# Patient Record
Sex: MALE | Race: ASIAN | NOT HISPANIC OR LATINO | URBAN - METROPOLITAN AREA
[De-identification: names, ages, dates, MRNs, and addresses within clinical notes are randomized per-mention and may not be internally consistent; named-entity substitution may affect disease eponyms.]

---

## 2020-07-28 ENCOUNTER — EMERGENCY (EMERGENCY)
Facility: HOSPITAL | Age: 30
LOS: 1 days | Discharge: HOME | End: 2020-07-28
Attending: EMERGENCY MEDICINE | Admitting: EMERGENCY MEDICINE
Payer: COMMERCIAL

## 2020-07-28 VITALS
SYSTOLIC BLOOD PRESSURE: 138 MMHG | HEIGHT: 67 IN | RESPIRATION RATE: 19 BRPM | HEART RATE: 75 BPM | WEIGHT: 263.01 LBS | DIASTOLIC BLOOD PRESSURE: 84 MMHG | TEMPERATURE: 98 F | OXYGEN SATURATION: 97 %

## 2020-07-28 VITALS
TEMPERATURE: 98 F | HEART RATE: 72 BPM | SYSTOLIC BLOOD PRESSURE: 131 MMHG | OXYGEN SATURATION: 98 % | RESPIRATION RATE: 17 BRPM | DIASTOLIC BLOOD PRESSURE: 85 MMHG

## 2020-07-28 PROCEDURE — 93005 ELECTROCARDIOGRAM TRACING: CPT

## 2020-07-28 PROCEDURE — 99283 EMERGENCY DEPT VISIT LOW MDM: CPT

## 2020-07-28 PROCEDURE — 71046 X-RAY EXAM CHEST 2 VIEWS: CPT

## 2020-07-28 PROCEDURE — 93010 ELECTROCARDIOGRAM REPORT: CPT

## 2020-07-28 PROCEDURE — 99283 EMERGENCY DEPT VISIT LOW MDM: CPT | Mod: 25

## 2020-07-28 PROCEDURE — 71046 X-RAY EXAM CHEST 2 VIEWS: CPT | Mod: 26

## 2020-07-28 NOTE — ED ADULT NURSE NOTE - OBJECTIVE STATEMENT
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 pt feels back to baseline. denies SOB or SP.

## 2020-07-28 NOTE — ED PROVIDER NOTE - OBJECTIVE STATEMENT
Patient was walking his route as a  today. In the heat, he felt as if he developed heat exhaustion. Was sweaty, weak. Then felt SOB and he had to stop and sit down. The SOB worried him so he came to be checked. Feels back to normal now. The SOB lasted about 5 minutes. No CP. No palpitations. No history of same

## 2020-07-28 NOTE — ED PROVIDER NOTE - CLINICAL SUMMARY MEDICAL DECISION MAKING FREE TEXT BOX
Patient with heat related symptoms, now resolved. Will get ekg and CXR to assess for other causes of transient SOB

## 2020-07-28 NOTE — ED PROVIDER NOTE - CARE PROVIDER_API CALL
Altaf Hartmann  CARDIOVASCULAR DISEASE  [address]  [place] [address]  Phone: [phone]  Fax: [phone]  Follow Up Time:

## 2020-07-28 NOTE — ED ADULT NURSE NOTE - CHIEF COMPLAINT QUOTE
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 Denies chest pain. I feel normal now but I was concerned. I threw up once today. I only ate 2 bananas today. I think I had heat exhaustion. I'm coming from urgent care to be further evaluated

## 2020-07-28 NOTE — ED ADULT TRIAGE NOTE - CHIEF COMPLAINT QUOTE
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 I feel normal now but I was concerned. I threw up once today. I only ate 2 bananas today. I think I had heat exhaustion. 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 Denies chest pain. I feel normal now but I was concerned. I threw up once today. I only ate 2 bananas today. I think I had heat exhaustion. I'm coming from urgent care to be further evaluated

## 2020-07-28 NOTE — ED ADULT NURSE NOTE - PERIPHERAL PULSES
[address] [facility] [id number]    [facility]      1/21/2020      This is to certify that Oz Wood is under my care.    Please excuse Oz from lifting no more than 15 pounds from 1/21/2020 through 2/5/2020 due to medical condition.     Comments: He will be having surgery on 2/6/2020. Further restrictions to come from surgeon.      Thank you,      Carol Morley MD  [facility]  [address], [place] [id number] [phone]   equal bilaterally

## 2020-07-28 NOTE — ED ADULT NURSE NOTE - CAS DISCH ACCOMP BY
ED Visit Note


First contact with patient:  15:38


 CHIEF COMPLAINT: Finger laceration





HISTORY OF PRESENT ILLNESS: This 3-year-old male patient presents to the 

emergency department after cutting the right finger first finger about 2 hours 

ago.  The patient was cutting a piece of sheet metal, when he accidentally cut 

the posterior proximal right thumb. The bleeding has has stopped. Denies 

weakness or numbness of the finger. The patient has full range of motion of the 

fingers. The patient rates the pain as sharp and 3/10.  The patient denies any 

other injuries. The patient's tetanus shot is not up to date.  The patient 

cleansed the wound at home and used commercial superglue over top of the wound.





REVIEW OF SYSTEMS: A 6 system review of systems was completed with positives 

and pertinent negatives listed in the HPI. 





ALLERGIES: No known allergies





MEDICATIONS: No chronic medications





PMH: Otherwise healthy





SOCIAL HISTORY: Lives locally





PHYSICAL EXAM: Vital Signs: Reviewed Nurse's notes, vital signs stable. GENERAL

: White male, in no acute distress, well developed, well nourished.  SKIN:  

There is a 1.0 cm long laceration on the proximal dorsal aspect of the right 

first finger.  The wound is covered by superglue and does not gape.  There is 

no bleeding. Extension and flexion of the finger is full and strong. Full range 

of motion of the wrist and other fingers. Capillary refill less than 2 seconds. 

Normal sensation to light and sharp touch.





EMERGENCY DEPARTMENT COURSE:  Physical exam and history were performed.  

Nursing notes and EMR were reviewed.  The patient appears to have suffered a 

small laceration to the back of his right thumb.  On exam he has full sensation 

and range of motion of the digit.  His wound appears clean and is well 

approximated and closed prior to my evaluation.  I do not feel that additional 

suturing would improve the patient's outcome.  He does need a tetanus, and this 

was updated.  Additionally I will provide the patient a short course of Keflex 

to prevent secondary infection.  The patient was pleased with this and voiced 

understanding.  He rated his discomfort a 0/10 at the time of departure.


Current/Historical Medications


Scheduled


Cephalexin Monohydrate (Keflex), 500 MG PO TID


Omeprazole (Prilosec), 20 MG PO DAILY





Scheduled PRN


Ibuprofen Tab (Advil), 400 MG PO QID PRN for Pain





Allergies


Coded Allergies:  


     No Known Allergies (Unverified , 4/10/16)





Vital Signs











  Date Time  Temp Pulse Resp B/P Pulse Ox O2 Delivery O2 Flow Rate FiO2


 


2/25/17 15:25 36.7 73 16 163/84 100 Room Air  











Departure Information


Impression





 Primary Impression:  


 Laceration of right thumb





Dispostion


Home / Self-Care





Condition


GOOD





Prescriptions





Cephalexin Monohydrate (Keflex) 500 Mg Cap


500 MG PO TID for 7 Days, #21 CAP


   Prov: Elias Alberts PA-C         2/25/17





Forms


HOME CARE DOCUMENTATION FORM,                                                 

               IMPORTANT VISIT INFORMATION





Patient Instructions


My [facility]





Additional Instructions





You were seen and evaluated today on an emergency basis only.  This is not a 

substitute for, or an effort to provide, complete comprehensive medical care.  

It is not possible to recognize and treat all injuries or illnesses in a single 

emergency department visit. For this reason it is recommended that you followup 

with your primary care physician next week with any ongoing or persistent 

symptoms.





Cephalexin(Keflex) 500mg: Take one pill 3 times daily for 7 days to prevent 

skin infection.  All antibiotics can cause diarrhea.  If this occurs and you 

feel worse or it does not resolve in 1-2 days follow up with your doctor or 

return to the Emergency Department as this could be signs of serious underlying 

problems.  Any medication can cause an allergic reaction, stop the pills 

immediately and return to the ER for rash, hives, breathing difficulties, or 

swelling.





You are welcome to return to the emergency department anytime with new, 

worsening, or concerning symptoms. Self

## 2020-07-28 NOTE — ED ADULT NURSE NOTE - CHPI ED NUR SYMPTOMS NEG
no hemoptysis/no fever/no edema/no chest pain/no diaphoresis/no shortness of breath/no cough/no wheezing/no headache

## 2022-10-31 NOTE — ED PROVIDER NOTE - NSPTACCESSSVCSAPPT_ED_ALL_ED
Anesthesia Pre Eval Note    Anesthesia ROS/Med Hx    Overall Review:  EKG was reviewed and Echo was reviewed       Cardiovascular Review:    Positive for CHF  Positive for past MI  Positive for CAD  Positive for hypertension    GI/HEPATIC/RENAL Review:    Positive for GERD Positive for liver disease   Positive for renal disease    End/Other Review:  Positive for diabetes  Positive for anemia  Positive for chronic pain  Positive for cancer  Additional Results:  EKG:  Encounter Date: 10/19/22  -Electrocardiogram 12-Lead:        Result                      Value                           Ventricular Rate EKG/M*     82                              Atrial Rate (BPM)           82                              OR-Interval (MSEC)          172                             QRS-Interval (MSEC)         92                              QT-Interval (MSEC)          396                             QTc                         462                             P Axis (Degrees)            119                             R Axis (Degrees)            26                              T Axis (Degrees)            -15                             REPORT TEXT                                             Sinus rhythm   with occasional   premature ventricular complexes   and   premature atrial complexes   Nonspecific T wave abnormality   Prolonged QT   When compared with ECG of   20-OCT-2022 00:35,   T wave inversion more evident in   Inferior leads   Confirmed by ERIC ROMO, MICKEY ([id number]) on 10/22/2022 7:44:36 PM    Echo:  Ejection Fraction       Date                     Value               Ref Range           Status                10/20/2022               57                  %                   Final            ----------      Relevant Problems   No relevant active problems       Physical Exam     Airway     TM Distance: >3 FB  Neck ROM: Limited  Neck: Stiffness and Non-tender  TMJ Mobility: Good    Cardiovascular    Cardio Rhythm: Regular  Cardio  Rate: Normal    Head Assessment  Head assessment: Normocephalic and Atraumatic    General Assessment  General Assessment: No acute distress and Alert and oriented    Dental Exam    Patient has:  Poor Dentition    Pulmonary Exam    Breath sounds clear to auscultation:  Yes      Anesthesia Plan:    Phone call attempted:   Message left on Answering Machine    ASA Status: 3  Anesthesia Type: MAC    Induction: Intravenous  Preferred Airway Type: MaskPatient has a difficult airway or is at risk of aspiration.   Maintenance: TIVA  Premedication: IV       Programming concerns addressed.    Post-op Pain Management: Per Surgeon  Postoperative analgesia plan does NOT include opiods    Checklist  Reviewed: Lab Results, Patient Summary, Care Everywhere, Allergies, Past Med History, Medications, DNR Status, Beta Blocker Status, Nursing Notes, EKG, Chest X-Rays, Consultations, Outside Records, NPO Status and Problem list  Consent/Risks Discussed Statement:  The proposed anesthetic plan, including its risks and benefits, have been discussed with the Patient along with the risks and benefits of alternatives. Questions were encouraged and answered and the patient and/or representative understands and agrees to proceed.    I have discussed elements of the patient's history or examination, as noted above and/or as follows, that place the patient at higher risk of complications; age and heart disease.    I discussed with the patient (and/or patient's legal representative) the risks and benefits of the proposed anesthesia plan, MAC, which may include services performed by other anesthesia providers.    Alternative anesthesia plans, if available, were reviewed with the patient (and/or patient's legal representative). Discussion has been held with the patient (and/or patient's legal representative) regarding risks of anesthesia, which include allergic reaction, eye injury, conversion to general anesthesia, nausea, vomiting, headache,  hypotension, aspiration, anxiety, back pain, death, dental injury, emergence delirium, depressed breathing, memory loss, persistent pain, organ damage, oral injury, nerve injury, infection and intra-operative awareness and emergent situations that may require change in anesthesia plan.    The patient (and/or patient's legal representative) has indicated understanding, his/her questions have been answered, and he/she wishes to proceed with the planned anesthetic.      Blood Products: Not Anticipated     PCP for Immediate Care

## 2023-09-23 NOTE — ED PROVIDER NOTE - PATIENT PORTAL LINK FT
89.8 You can access the FollowMyHealth Patient Portal offered by [facility] by registering at the following website: [email or web address]. By joining [facility]’s FollowMyHealth portal, you will also be able to view your health information using other applications (apps) compatible with our system.

## 2025-05-05 NOTE — ED ADULT NURSE NOTE - NURSING NEURO LEVEL OF CONSCIOUSNESS
Called to schedule pt for appt. Pt didn't answer and voicemail was too full to leave a message. A pt portal message will be sent out    alert and awake